# Patient Record
Sex: FEMALE | Race: WHITE | URBAN - METROPOLITAN AREA
[De-identification: names, ages, dates, MRNs, and addresses within clinical notes are randomized per-mention and may not be internally consistent; named-entity substitution may affect disease eponyms.]

---

## 2022-01-17 ENCOUNTER — LAB VISIT (OUTPATIENT)
Dept: PRIMARY CARE CLINIC | Facility: OTHER | Age: 32
End: 2022-01-17
Attending: INTERNAL MEDICINE
Payer: COMMERCIAL

## 2022-01-17 DIAGNOSIS — Z11.52 ENCOUNTER FOR SCREENING FOR COVID-19: Primary | ICD-10-CM

## 2022-01-17 PROCEDURE — U0003 INFECTIOUS AGENT DETECTION BY NUCLEIC ACID (DNA OR RNA); SEVERE ACUTE RESPIRATORY SYNDROME CORONAVIRUS 2 (SARS-COV-2) (CORONAVIRUS DISEASE [COVID-19]), AMPLIFIED PROBE TECHNIQUE, MAKING USE OF HIGH THROUGHPUT TECHNOLOGIES AS DESCRIBED BY CMS-2020-01-R: HCPCS | Performed by: INTERNAL MEDICINE

## 2022-01-18 LAB
SARS-COV-2 RNA RESP QL NAA+PROBE: NOT DETECTED
SARS-COV-2- CYCLE NUMBER: NORMAL

## 2022-01-26 ENCOUNTER — PATIENT MESSAGE (OUTPATIENT)
Dept: INTERNAL MEDICINE | Facility: CLINIC | Age: 32
End: 2022-01-26

## 2022-01-26 ENCOUNTER — TELEPHONE (OUTPATIENT)
Dept: INTERNAL MEDICINE | Facility: CLINIC | Age: 32
End: 2022-01-26
Payer: COMMERCIAL

## 2022-01-26 ENCOUNTER — OFFICE VISIT (OUTPATIENT)
Dept: INTERNAL MEDICINE | Facility: CLINIC | Age: 32
End: 2022-01-26
Payer: COMMERCIAL

## 2022-01-26 DIAGNOSIS — J01.90 ACUTE SINUSITIS, RECURRENCE NOT SPECIFIED, UNSPECIFIED LOCATION: Primary | ICD-10-CM

## 2022-01-26 PROBLEM — F32.A DEPRESSION: Status: ACTIVE | Noted: 2022-01-26

## 2022-01-26 PROCEDURE — 1160F PR REVIEW ALL MEDS BY PRESCRIBER/CLIN PHARMACIST DOCUMENTED: ICD-10-PCS | Mod: CPTII,95,, | Performed by: NURSE PRACTITIONER

## 2022-01-26 PROCEDURE — 99213 OFFICE O/P EST LOW 20 MIN: CPT | Mod: 95,,, | Performed by: NURSE PRACTITIONER

## 2022-01-26 PROCEDURE — 1159F MED LIST DOCD IN RCRD: CPT | Mod: CPTII,95,, | Performed by: NURSE PRACTITIONER

## 2022-01-26 PROCEDURE — 1159F PR MEDICATION LIST DOCUMENTED IN MEDICAL RECORD: ICD-10-PCS | Mod: CPTII,95,, | Performed by: NURSE PRACTITIONER

## 2022-01-26 PROCEDURE — 99213 PR OFFICE/OUTPT VISIT, EST, LEVL III, 20-29 MIN: ICD-10-PCS | Mod: 95,,, | Performed by: NURSE PRACTITIONER

## 2022-01-26 PROCEDURE — 1160F RVW MEDS BY RX/DR IN RCRD: CPT | Mod: CPTII,95,, | Performed by: NURSE PRACTITIONER

## 2022-01-26 RX ORDER — AZITHROMYCIN 250 MG/1
250 TABLET, FILM COATED ORAL DAILY
Qty: 6 TABLET | Refills: 0 | Status: SHIPPED | OUTPATIENT
Start: 2022-01-26

## 2022-01-26 RX ORDER — FLUOXETINE 10 MG/1
10 CAPSULE ORAL DAILY
COMMUNITY

## 2022-01-26 NOTE — TELEPHONE ENCOUNTER
I tried to reach the pt regarding an excuse they were requesting but, there was no answer nor an option to leave a vm. Please review and advise as to whether an excuse can be given. Thanks //kah

## 2022-01-26 NOTE — PROGRESS NOTES
Subjective:       Patient ID: Suzanne Morales is a 32 y.o. female.    Chief Complaint: No chief complaint on file.    The patient location is: [place]  The chief complaint leading to consultation is: sore throat and congestion    Visit type: audio only    Face to Face time with patient: 5  minutes of total time spent on the encounter, which includes face to face time and non-face to face time preparing to see the patient (eg, review of tests), Obtaining and/or reviewing separately obtained history, Documenting clinical information in the electronic or other health record, Independently interpreting results (not separately reported) and communicating results to the patient/family/caregiver, or Care coordination (not separately reported).         Each patient to whom he or she provides medical services by telemedicine is:  (1) informed of the relationship between the physician and patient and the respective role of any other health care provider with respect to management of the patient; and (2) notified that he or she may decline to receive medical services by telemedicine and may withdraw from such care at any time.    Notes:     Patient presents with sore throat and congestion.  Started last Monday.  Getting worse.  Started to have fever last night.      Had 2 Covid test and a flu test: all negative.     Sore Throat   This is a new problem. The current episode started yesterday. The problem has been gradually worsening. The pain is worse on the left side. The maximum temperature recorded prior to her arrival was 100 - 100.9 F. The pain is at a severity of 3/10. The pain is moderate. Associated symptoms include congestion, headaches and swollen glands. Pertinent negatives include no abdominal pain, coughing, diarrhea, drooling, ear discharge, ear pain, hoarse voice, plugged ear sensation, neck pain, shortness of breath, stridor, trouble swallowing or vomiting. She has had no exposure to strep or mono. She has tried  NSAIDs and acetaminophen for the symptoms. The treatment provided mild relief.     Review of Systems   HENT: Positive for nasal congestion, sinus pressure/congestion and sore throat. Negative for drooling, ear discharge, ear pain, hoarse voice and trouble swallowing.    Respiratory: Negative for cough, shortness of breath and stridor.    Gastrointestinal: Negative for abdominal pain, diarrhea and vomiting.   Musculoskeletal: Negative for neck pain.   Neurological: Positive for headaches.   Psychiatric/Behavioral: Negative for agitation and confusion.         Objective:      Physical Exam  Neurological:      Mental Status: She is alert.         Assessment:       Problem List Items Addressed This Visit    None     Visit Diagnoses     Acute sinusitis, recurrence not specified, unspecified location    -  Primary          Plan:           Acute sinusitis, recurrence not specified, unspecified location  -     azithromycin (Z-LORETTA) 250 MG tablet; Take 1 tablet (250 mg total) by mouth once daily. Take 2 tablets by mouth on day 1, then one tablet daily on days 2-5.  Dispense: 6 tablet; Refill: 0        Instructed to take all medications as ordered.  Informed if no improvement or symptoms worsens to follow up with primary care physician.  Informed to hydrate and rest.

## 2022-01-26 NOTE — LETTER
January 26, 2022    Suzanne Morales  [id number] [place] [id number]             [facility]  [facility] Medicine  [address] [place] [address]  [place] [address]  Phone: [phone]  Fax: [phone]   January 26, 2022     Patient: Suzanne Morales   YOB: 1990   Date of Visit: 1/26/2022       To Whom it May Concern:    Suzanne Morales was seen in my clinic on 1/26/2022. She may return to work on 01/27/2022.    Please excuse her from any work missed.    If you have any questions or concerns, please don't hesitate to call.    Sincerely,         Carolann Gonzalez NP

## 2022-01-26 NOTE — TELEPHONE ENCOUNTER
----- Message from Penny Gr sent at 1/26/2022  1:41 PM CST -----  Please call pt @ [phone] regarding appt today, pt states a doctor excuse should be in mychart, pt did not see, please advise

## 2022-01-26 NOTE — TELEPHONE ENCOUNTER
I received a call back from the pt and she was following up on her excuse. I sent it to her as a message she replied she has it. //kah

## 2022-02-03 ENCOUNTER — PATIENT MESSAGE (OUTPATIENT)
Dept: INTERNAL MEDICINE | Facility: CLINIC | Age: 32
End: 2022-02-03
Payer: COMMERCIAL

## 2022-02-03 DIAGNOSIS — J01.90 ACUTE SINUSITIS, RECURRENCE NOT SPECIFIED, UNSPECIFIED LOCATION: Primary | ICD-10-CM

## 2022-02-07 RX ORDER — METHYLPREDNISOLONE 4 MG/1
TABLET ORAL
Qty: 1 EACH | Refills: 0 | Status: SHIPPED | OUTPATIENT
Start: 2022-02-07

## 2025-02-12 PROBLEM — Z01.419 WELL WOMAN EXAM WITH ROUTINE GYNECOLOGICAL EXAM: Status: ACTIVE | Noted: 2025-02-12

## 2025-02-12 PROBLEM — Z11.3 SCREENING EXAMINATION FOR STD (SEXUALLY TRANSMITTED DISEASE): Status: ACTIVE | Noted: 2025-02-12

## 2025-02-12 PROBLEM — L70.9 ACNE: Status: ACTIVE | Noted: 2025-02-12

## 2025-07-17 NOTE — PROGRESS NOTES
Patient ID: Suzanne Morales is a 35 y.o. female.    Chief Complaint: Establish Care      History of Present Illness    - Ms. Morales presents with concern about a newly discovered lump on her chest between the collarbone and breast.    Ms. Morales reports discovering an apparent lump on her chest a few days ago, located between the collarbone and the breast, not on the breast itself. She describes it as a spongy, flat lump with a texture similar to a lipoma. The lump is more palpable when her bra is removed, as the bra pushes up some of the fat when worn. Compared to the other side of her chest, where there is a divot, this side has a noticeable lump. Ms. Morales is uncertain about the duration of the lump's presence, as its flat nature could have made it unnoticeable for some time. She expresses concern and a desire for professional evaluation, including willingness for further testing such as ultrasound or needle biopsy if necessary.      ROS:  General: denies fever, denies chills, denies fatigue, denies weight gain, denies weight loss  Eyes: denies vision changes, denies redness, denies discharge  ENT: denies ear pain, denies nasal congestion, denies sore throat  Cardiovascular: denies chest pain, denies palpitations, denies lower extremity edema  Respiratory: denies cough, denies shortness of breath  Gastrointestinal: denies abdominal pain, denies nausea, denies vomiting, denies diarrhea, denies constipation, denies blood in stool  Genitourinary: denies dysuria, denies hematuria, denies frequency  Musculoskeletal: denies joint pain, denies muscle pain  Skin: denies rash, denies lesion  Neurological: denies headache, denies dizziness, denies numbness, denies tingling  Psychiatric: denies anxiety, denies depression, denies sleep difficulty  Lymphatics: complains of lumps/masses, complains of breast lumps            Physical Exam    General: In no acute distress.  Head: Normocephalic. Non traumatic.  Eyes: PERRLA. EOMs  "full. Conjunctivae clear. Fundi grossly normal.  Ears: EACs clear. TMs normal.  Nose: Mucosa pink. Mucosa moist. No obstruction.  Throat: Clear. No exudates. No lesions.  Neck: Supple. No masses. No thyromegaly. No bruits.  Chest: Lungs clear. No rales. No rhonchi. No wheezes.  Heart: RRR. No murmurs. No rubs. No gallops.  Abdomen: Soft. No tenderness. No masses. BS normal.  : Normal external genitalia. No lesions. No discharge. No hernias  noted.  Back: Normal curvature. No scoliosis. No tenderness.  Extremities: Warm. Well perfused. No upper extremity edema. No lower extremity edema. FROM. No deformities. No joint erythema.  Neuro: No focal deficits appreciated. Good muscle tone. Normal response to visual stimuli. Normal response to auditory stimuli.  Skin: Normal. No rashes. No lesions noted.  Breast: PALPABLE MASS WITH FATTY TISSUE NEAR BREAST.          Current Medications[1]            VITAL SIGNS:  Vitals:    07/18/25 1346   BP: 112/80   BP Location: Left arm   Pulse: 80   Temp: 98 °F (36.7 °C)   TempSrc: Tympanic   SpO2: 98%   Weight: 78 kg (171 lb 15.3 oz)   Height: 5' 8" (1.727 m)       CURRENT BMI:   Body mass index is 26.15 kg/m².    LABS REVIEWED:    CBC:  No results found for: "WBC", "RBC", "HGB", "HCT", "MCV", "MCH", "MCHC", "RDW", "PLT", "MPV", "GRAN", "LYMPH", "MONO", "EOS", "BASO", "EOSINOPHIL", "BASOPHIL"    CHEMISTRY:  No results found for: "NA", "K", "CL", "CO2", "GLU", "BUN", "CREATININE", "CALCIUM", "PROT", "ALBUMIN", "BILITOT", "ALKPHOS", "AST", "ALT", "ANIONGAP", "EGFRNORACEVR"    LIPID PANEL:  Lab Results   Component Value Date    CHOL 123 11/18/2016     No results found for: "TRIG"  No results found for: "HDL"  No results found for: "LDLCALC"    THYROID:  No results found for: "TSH", "X1YVWYO", "K3OXRWQ", "THYROIDAB", "FREET4"    DIABETES:  No results found for: "LABA1C", "HGBA1C"  No results found for: "MICALBCREAT"    Assessment and Plan     Assessment & Plan    R22.2 Chest wall " mass    CHEST WALL MASS:  - Palpated lump in chest area between collarbone and breast.  - Suspect possible lymph node involvement with surrounding fatty tissue.  - Explained that most lumps in this area are often due to inflammatory processes, such as a minor infection or cut elsewhere in the body.  - Discussed that lymph nodes can swell as part of the body's normal immune response.  - Informed patient about the typical course of benign lumps, which usually resolve on their own once the underlying cause is resolved.  - Ordered chest US to evaluate lump.  - Follow up after US results for further evaluation and in approximately 1 month if US is inconclusive and lump persists.          1. Chest wall mass  Comments:  Will order US of soft tissue of chest. Suspect lymph node  Orders:  -     US Soft Tissue Chest_Upper Back; Future; Expected date: 07/18/2025           No follow-ups on file.  36 of total time spent on the encounter, which includes face to face time and non-face to face time preparing to see the patient. This includes obtaining and/or reviewing separately obtained history, performing a medically appropriate examination and/or evaluation, and counseling and educating the patient/family/caregiver. Includes documenting clinical information in the electronic or other health record, independently interpreting results (not separately reported) and communicating results to the patient/family/caregiver, with care coordination (not separately reported). Medications, tests and/or procedures ordered as necessary along with referring and communicating with other health professionals (when not separately reported).      This note was generated with the assistance of ambient listening technology. Verbal consent was obtained by the patient and accompanying visitor(s) for the recording of patient appointment to facilitate this note. I attest to having reviewed and edited the generated note for accuracy, though some syntax  or spelling errors may persist. Please contact the author of this note for any clarification.            [1]   Current Outpatient Medications:     FLUoxetine (PROZAC) 10 MG capsule, Take 10 mg by mouth once daily., Disp: , Rfl:     spironolactone (ALDACTONE) 25 MG tablet, Take 1 tablet (25 mg total) by mouth once daily., Disp: 30 tablet, Rfl: 11

## 2025-07-18 ENCOUNTER — APPOINTMENT (OUTPATIENT)
Dept: RADIOLOGY | Facility: HOSPITAL | Age: 35
End: 2025-07-18
Attending: INTERNAL MEDICINE
Payer: COMMERCIAL

## 2025-07-18 ENCOUNTER — OFFICE VISIT (OUTPATIENT)
Dept: FAMILY MEDICINE | Facility: CLINIC | Age: 35
End: 2025-07-18
Payer: COMMERCIAL

## 2025-07-18 VITALS
SYSTOLIC BLOOD PRESSURE: 112 MMHG | DIASTOLIC BLOOD PRESSURE: 80 MMHG | HEIGHT: 68 IN | WEIGHT: 171.94 LBS | BODY MASS INDEX: 26.06 KG/M2 | HEART RATE: 80 BPM | TEMPERATURE: 98 F | OXYGEN SATURATION: 98 %

## 2025-07-18 DIAGNOSIS — R22.2 CHEST WALL MASS: Primary | ICD-10-CM

## 2025-07-18 DIAGNOSIS — R22.2 CHEST WALL MASS: ICD-10-CM

## 2025-07-18 PROCEDURE — 99999 PR PBB SHADOW E&M-EST. PATIENT-LVL III: CPT | Mod: PBBFAC,,, | Performed by: INTERNAL MEDICINE

## 2025-07-18 PROCEDURE — 76604 US EXAM CHEST: CPT | Mod: TC,PO

## 2025-07-18 PROCEDURE — 76604 US EXAM CHEST: CPT | Mod: 26,,, | Performed by: RADIOLOGY
